# Patient Record
Sex: MALE | Race: WHITE | Employment: FULL TIME
[De-identification: names, ages, dates, MRNs, and addresses within clinical notes are randomized per-mention and may not be internally consistent; named-entity substitution may affect disease eponyms.]

---

## 2024-05-27 ENCOUNTER — HOSPITAL ENCOUNTER (EMERGENCY)
Facility: HOSPITAL | Age: 34
Discharge: HOME | End: 2024-05-27
Attending: STUDENT IN AN ORGANIZED HEALTH CARE EDUCATION/TRAINING PROGRAM
Payer: COMMERCIAL

## 2024-05-27 VITALS
SYSTOLIC BLOOD PRESSURE: 111 MMHG | WEIGHT: 211.42 LBS | OXYGEN SATURATION: 96 % | HEART RATE: 100 BPM | RESPIRATION RATE: 16 BRPM | BODY MASS INDEX: 28.64 KG/M2 | HEIGHT: 72 IN | TEMPERATURE: 97 F | DIASTOLIC BLOOD PRESSURE: 82 MMHG

## 2024-05-27 DIAGNOSIS — S61.213A LACERATION OF LEFT MIDDLE FINGER WITHOUT FOREIGN BODY WITHOUT DAMAGE TO NAIL, INITIAL ENCOUNTER: ICD-10-CM

## 2024-05-27 DIAGNOSIS — S61.310A LACERATION OF RIGHT INDEX FINGER WITHOUT FOREIGN BODY WITH DAMAGE TO NAIL, INITIAL ENCOUNTER: Primary | ICD-10-CM

## 2024-05-27 PROCEDURE — 12041 INTMD RPR N-HF/GENIT 2.5CM/<: CPT

## 2024-05-27 PROCEDURE — 2500000003 HC RX 250 WO HCPCS

## 2024-05-27 PROCEDURE — 99283 EMERGENCY DEPT VISIT LOW MDM: CPT

## 2024-05-27 PROCEDURE — 6370000000 HC RX 637 (ALT 250 FOR IP)

## 2024-05-27 RX ORDER — GINSENG 100 MG
CAPSULE ORAL
Status: COMPLETED | OUTPATIENT
Start: 2024-05-27 | End: 2024-05-27

## 2024-05-27 RX ORDER — LIDOCAINE HYDROCHLORIDE 10 MG/ML
5 INJECTION, SOLUTION EPIDURAL; INFILTRATION; INTRACAUDAL; PERINEURAL
Status: COMPLETED | OUTPATIENT
Start: 2024-05-27 | End: 2024-05-27

## 2024-05-27 RX ORDER — GINSENG 100 MG
CAPSULE ORAL
Status: COMPLETED
Start: 2024-05-27 | End: 2024-05-27

## 2024-05-27 RX ADMIN — LIDOCAINE HYDROCHLORIDE 5 ML: 10 INJECTION, SOLUTION EPIDURAL; INFILTRATION; INTRACAUDAL; PERINEURAL at 16:45

## 2024-05-27 RX ADMIN — Medication: at 18:33

## 2024-05-27 ASSESSMENT — PAIN DESCRIPTION - LOCATION: LOCATION: HAND

## 2024-05-27 ASSESSMENT — LIFESTYLE VARIABLES
HOW MANY STANDARD DRINKS CONTAINING ALCOHOL DO YOU HAVE ON A TYPICAL DAY: 1 OR 2
HOW OFTEN DO YOU HAVE A DRINK CONTAINING ALCOHOL: MONTHLY OR LESS

## 2024-05-27 ASSESSMENT — PAIN DESCRIPTION - DESCRIPTORS: DESCRIPTORS: THROBBING

## 2024-05-27 ASSESSMENT — PAIN SCALES - GENERAL
PAINLEVEL_OUTOF10: 0
PAINLEVEL_OUTOF10: 0
PAINLEVEL_OUTOF10: 4

## 2024-05-27 ASSESSMENT — PAIN DESCRIPTION - ORIENTATION: ORIENTATION: RIGHT

## 2024-05-27 ASSESSMENT — ENCOUNTER SYMPTOMS: COLOR CHANGE: 0

## 2024-05-27 NOTE — DISCHARGE INSTRUCTIONS
We would like for you to follow-up with the office of Dr. Ronny Smith, a hand specialist/surgeon for further evaluation of your finger and for management of the 2 nonabsorbable sutures placed.  Please keep the laceration site clean and dry for the first 24 hours, after which you may gently wash with soap and water and pat dry.  If signs of infection occur, to include increased redness, drainage, or increase in pain, please report to the nearest emergency department.    Thank you for allowing us to provide you with medical care today.  We realize that you have many choices for your emergency care needs.  We thank you for choosing [facility].  Please choose us in the future for any continued health care needs.     The exam and treatment you received in the Emergency Department were for an emergent problem and are not intended as complete care. It is important that you follow up with a doctor, nurse practitioner, or physician's assistant for ongoing care. If your symptoms worsen or you do not improve as expected and you are unable to reach your usual health care provider, you should return to the Emergency Department.  We are available 24 hours a day.     Please make an appointment with your health care provider(s) for follow up of your Emergency Department visit.  Take this sheet with you when you go to your follow-up visit.

## 2024-05-27 NOTE — ED TRIAGE NOTES
Patient arrives with c/o right hand index and middle finger laceration with slicer while cutting celery about 20 minutes ago. Reports tetanus within date.

## 2024-05-27 NOTE — ED NOTES
Patient given discharge papers and instructions by this RN. Patient verbalized understanding and stated not having questions or concerns regarding his care. Patient ambulatory out of ED with family in no new acute distress.

## 2024-05-27 NOTE — ED PROVIDER NOTES
ED SIGN OUT NOTE  Care assumed at [facility] 6:33 PM EDT      Patient with mandolin injury to the right index finger, involving a V shaped portion of the nail. Not involving the nailbed. Removed the V shaped retained nail and undermined for insertion of a piece of 5-0 gut packaging cut to fit into the space. Gently slipped it underneath the remaining nail edges to help prevent ingrown nail. Tacked down with 2x 4-0 nylon sutures as described below. Discussed close observation for ingrowing nail, infection. They will need to be evaluated by hand surgery for nail growth and for suture removal. Discussed cosmesis and wound healing, they will follow up. Splint provided for protection.       Lac Repair    Date/Time: 5/27/2024 6:33 PM    Performed by: Katelynn Ulrich DO  Authorized by: Katelynn Ulrich DO    Consent:     Consent obtained:  Verbal    Consent given by:  Patient    Risks discussed:  Infection, pain, vascular damage, poor cosmetic result, poor wound healing, nerve damage and need for additional repair    Alternatives discussed:  Delayed treatment and referral  Anesthesia:     Anesthesia method:  Nerve block    Block anesthetic:  Lidocaine 1% w/o epi  Laceration details:     Location:  Finger    Finger location:  R index finger  Exploration:     Hemostasis achieved with:  Direct pressure  Treatment:     Amount of cleaning:  Extensive    Irrigation solution:  Sterile saline    Debridement:  Minimal    Undermining:  Minimal  Skin repair:     Repair method:  Sutures    Suture size:  4-0    Suture material:  Nylon    Suture technique:  Simple interrupted    Number of sutures:  2  Repair type:     Repair type:  Intermediate  Post-procedure details:     Dressing:  Antibiotic ointment, adhesive bandage and splint for protection    Procedure completion:  Tolerated well, no immediate complications         Katelynn Ulrich DO  05/27/24 [id number]    
[facility]  [id number] [id number] [phone]    As needed, If symptoms worsen      DISCHARGE MEDICATIONS:  There are no discharge medications for this patient.        (Please note that portions of this note were completed with a voice recognition program.  Efforts were made to edit the dictations but occasionally words are mis-transcribed.)    Joaquín Soliz PA-C (electronically signed)  Emergency Attending Physician / Physician Assistant / Nurse Practitioner              Joaquín Soliz PA-C  05/27/24 1927